# Patient Record
Sex: FEMALE | Race: WHITE
[De-identification: names, ages, dates, MRNs, and addresses within clinical notes are randomized per-mention and may not be internally consistent; named-entity substitution may affect disease eponyms.]

---

## 2020-08-30 ENCOUNTER — HOSPITAL ENCOUNTER (EMERGENCY)
Dept: HOSPITAL 92 - ERS | Age: 35
LOS: 1 days | Discharge: HOME | End: 2020-08-31
Payer: SELF-PAY

## 2020-08-30 DIAGNOSIS — F17.210: ICD-10-CM

## 2020-08-30 DIAGNOSIS — I10: ICD-10-CM

## 2020-08-30 DIAGNOSIS — F90.9: ICD-10-CM

## 2020-08-30 DIAGNOSIS — F15.10: ICD-10-CM
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
## 2020-08-30 DIAGNOSIS — F41.9: ICD-10-CM

## 2020-08-30 DIAGNOSIS — S00.83XA: Primary | ICD-10-CM

## 2020-08-30 DIAGNOSIS — K02.9: ICD-10-CM

## 2020-08-30 PROCEDURE — 81015 MICROSCOPIC EXAM OF URINE: CPT

## 2020-08-30 PROCEDURE — 96360 HYDRATION IV INFUSION INIT: CPT

## 2020-08-30 PROCEDURE — 80306 DRUG TEST PRSMV INSTRMNT: CPT

## 2020-08-30 PROCEDURE — 70486 CT MAXILLOFACIAL W/O DYE: CPT

## 2020-08-30 PROCEDURE — 36416 COLLJ CAPILLARY BLOOD SPEC: CPT

## 2020-08-30 PROCEDURE — 96361 HYDRATE IV INFUSION ADD-ON: CPT

## 2020-08-30 PROCEDURE — 81003 URINALYSIS AUTO W/O SCOPE: CPT

## 2020-08-30 PROCEDURE — 93005 ELECTROCARDIOGRAM TRACING: CPT

## 2020-08-30 PROCEDURE — 80053 COMPREHEN METABOLIC PANEL: CPT

## 2020-08-30 PROCEDURE — 85025 COMPLETE CBC W/AUTO DIFF WBC: CPT

## 2020-08-30 PROCEDURE — 81025 URINE PREGNANCY TEST: CPT

## 2020-08-30 PROCEDURE — 70450 CT HEAD/BRAIN W/O DYE: CPT

## 2020-08-31 LAB
ALBUMIN SERPL BCG-MCNC: 4.7 G/DL (ref 3.5–5)
ALP SERPL-CCNC: 72 U/L (ref 40–110)
ALT SERPL W P-5'-P-CCNC: 15 U/L (ref 8–55)
ANION GAP SERPL CALC-SCNC: 19 MMOL/L (ref 10–20)
AST SERPL-CCNC: 17 U/L (ref 5–34)
BACTERIA UR QL AUTO: (no result) HPF
BASOPHILS # BLD AUTO: 0 THOU/UL (ref 0–0.2)
BASOPHILS NFR BLD AUTO: 0.3 % (ref 0–1)
BILIRUB SERPL-MCNC: 0.7 MG/DL (ref 0.2–1.2)
BUN SERPL-MCNC: 16 MG/DL (ref 7–18.7)
CALCIUM SERPL-MCNC: 9.5 MG/DL (ref 7.8–10.44)
CHLORIDE SERPL-SCNC: 103 MMOL/L (ref 98–107)
CO2 SERPL-SCNC: 21 MMOL/L (ref 22–29)
CREAT CL PREDICTED SERPL C-G-VRATE: 0 ML/MIN (ref 70–130)
DRUG SCREEN CUTOFF: (no result)
EOSINOPHIL # BLD AUTO: 0.1 THOU/UL (ref 0–0.7)
EOSINOPHIL NFR BLD AUTO: 1.2 % (ref 0–10)
GLOBULIN SER CALC-MCNC: 4 G/DL (ref 2.4–3.5)
GLUCOSE SERPL-MCNC: 156 MG/DL (ref 70–105)
HGB BLD-MCNC: 13.3 G/DL (ref 12–16)
LEUKOCYTE ESTERASE UR QL STRIP.AUTO: 500 LEU/UL
LYMPHOCYTES # BLD: 2.3 THOU/UL (ref 1.2–3.4)
LYMPHOCYTES NFR BLD AUTO: 24.5 % (ref 21–51)
MCH RBC QN AUTO: 26.5 PG (ref 27–31)
MCV RBC AUTO: 78.2 FL (ref 78–98)
MEDTOX CONTROL LINE VALID?: (no result)
MEDTOX READER #: (no result)
MONOCYTES # BLD AUTO: 0.6 THOU/UL (ref 0.11–0.59)
MONOCYTES NFR BLD AUTO: 6.2 % (ref 0–10)
NEUTROPHILS # BLD AUTO: 6.5 THOU/UL (ref 1.4–6.5)
NEUTROPHILS NFR BLD AUTO: 67.8 % (ref 42–75)
PLATELET # BLD AUTO: 350 THOU/UL (ref 130–400)
POTASSIUM SERPL-SCNC: 2.7 MMOL/L (ref 3.5–5.1)
PREGU CONTROL BACKGROUND?: (no result)
PREGU CONTROL BAR APPEAR?: YES
PROT UR STRIP.AUTO-MCNC: 50 MG/DL
RBC # BLD AUTO: 5 MILL/UL (ref 4.2–5.4)
SODIUM SERPL-SCNC: 140 MMOL/L (ref 136–145)
SP GR UR STRIP: 1.02 (ref 1–1.04)
WBC # BLD AUTO: 9.5 THOU/UL (ref 4.8–10.8)
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
## 2021-02-03 ENCOUNTER — HOSPITAL ENCOUNTER (EMERGENCY)
Dept: HOSPITAL 92 - ERS | Age: 36
Discharge: HOME | End: 2021-02-03
Payer: COMMERCIAL

## 2021-02-03 DIAGNOSIS — F17.210: ICD-10-CM

## 2021-02-03 DIAGNOSIS — I10: ICD-10-CM

## 2021-02-03 DIAGNOSIS — U07.1: Primary | ICD-10-CM

## 2021-02-03 DIAGNOSIS — R03.0: ICD-10-CM

## 2021-02-03 LAB — SARS-COV-2 RNA RESP QL NAA+PROBE: DETECTED

## 2021-02-03 PROCEDURE — 0240U: CPT

## 2021-02-03 PROCEDURE — 93005 ELECTROCARDIOGRAM TRACING: CPT

## 2021-02-03 PROCEDURE — 71045 X-RAY EXAM CHEST 1 VIEW: CPT

## 2021-02-03 NOTE — RAD
PORTABLE CHEST 1 VIEW:

 

Date:  02/03/2021

Time:  0928 hours

 

HISTORY:  

Cough and shortness of breath. 

 

COMPARISON:  

03/31/2019. 

 

FINDINGS:

The heart size is normal. The lungs are expanded without lobar consolidation, pneumothoraces, or pleu
ral effusions. 

 

IMPRESSION: 

No acute process. 

 

 

 

POS: OFF
Monitored - 3 [place]